# Patient Record
Sex: FEMALE | Race: WHITE | NOT HISPANIC OR LATINO | URBAN - METROPOLITAN AREA
[De-identification: names, ages, dates, MRNs, and addresses within clinical notes are randomized per-mention and may not be internally consistent; named-entity substitution may affect disease eponyms.]

---

## 2017-03-15 NOTE — PATIENT DISCUSSION
UV Protection ATTENDING ADDENDUM: Agree with resident assessment and plan, except: Parents report improved spit ups since thickening feeds. Noted blueness of toes/feet bilaterally today while lying flat and asleep (not associated with feeds). Rash is coming back since yesterday. Parents say that he has been itching at his skin. No bloody stools.    Exam:  VS: afebrile, -150s, RR 40s, no hypoxia, BPs appropriate for age  I/O: 540 ML PO, 2.8 ml/kg/h UOP, no stool  General: well appearing, held by mom, no distress  HEENT: AFOF, normal conjunctiva, moist mucous membranes  Lungs: clear, no difficulty breathing  CV: no murmurs, regular rate and rhythm, no rubs or gallops  Abd: no HSM, soft, not tender, not distended  : normal genitalia  Skin: scattered hives and erythematous blanching macules, few on trunk and arms; none involving palms/soles or  region; no edema, no pallor    Labs/Imaging:  -FOBT negative  -Rhino/Entero+  -CBC: no leukocytosis, no eosinophils  -CMP: mild transaminitis, no acidosis  -CXR: normal    A/P: 4 month old full term male with recent diagnosis of milk protein allergy presents with cyanotic and apnea episodes. Etiology at this time seems most likely related to aspiration of feeds. Since thickening feeds, fewer spit ups and no apneic episodes. Parents do report episode of acrocyanosis this morning though no hypoxic episodes noted. Cardiology has seen and cleared patient (though no echo performed). No concern for underlying neurologic process. Rash does seem consistent with hives and possibly allergic component. Discussed continuing on elecare for now to eliminate possibility of milk protein allergy and continuing to avoid detergents/soaps at home with additives/fragrances. He is hemodynamically stable but requires admission to monitor for continued tolerance of thickened feeds and no further episodes of cyanosis/apnea.    1. Cyanotic/Apneic episodes: Cardiology work up normal. Speech evaluation showing silent penetration with regular formula. ENT exam showing mild post-cricoid edema. By history also with symptoms of reflux. Episodes now seem to be consistent with likely aspiration events and reflux. Overall symptoms improving with change in nipple and thickening of feeds.   -Continue elecare with level 2 nipple and thickened per speech recommendations. Will need to arrange for speech therapy as outpatient.  -Continuous pulse ox to monitor if hypoxia if/when cyanotic episode recurs. Will monitor for further bradycardic episodes as well.  -Can consider starting zantac if needed for continued reflux symptoms. As now improving, will hold off.    2. Milk protein allergy:   -FOr now, will continue on elecare for presumed milk protein allergy. Given his symptoms of spit ups/reflux and hives (intermittent), could be explained by milk protein allergy though FOBT is negative.   -Will follow up with allergy as outpatient.      Anticipated Discharge Date: 10/18 if no furthe rapneic/cyanotic episodes  [ ] Social Work needs:  [ ] Case management needs:  [ ] Other discharge needs:    Family Centered Rounds completed with parents and nursing. Patient seen at 12PM on 10/17/17.  I have read and agree with this Progress Note.  I examined the patient this morning and agree with above resident physical exam, with edits made where appropriate.  I was physically present for the evaluation and management services provided.     [x] Reviewed lab results  [x ] Reviewed Radiology  [ x] Spoke with parents/guardian  [ x] Spoke with consultant    [ x] 35 minutes or more was spent on the total encounter with more than 50% of the visit spent on counseling and / or coordination of care    Yennifer Guerin MD  Pediatric Hospitalist  # [phone]

## 2017-07-14 ENCOUNTER — IMPORTED ENCOUNTER (OUTPATIENT)
Dept: URBAN - METROPOLITAN AREA CLINIC 50 | Facility: CLINIC | Age: 82
End: 2017-07-14

## 2018-03-19 NOTE — PATIENT DISCUSSION
AMD (DRY), OU:  PRESCRIBE AREDS 2 VITAMINS AND UV PROTECTION TO SLOW PROGRESSION. SMOKING CESSATION EMPHASIZED. PRESCRIBE REGULAR AMSLER GRID CHECKS TO SELF MONITOR. PATIENT TO CALL WITH CHANGES FOR RETINA CONSULT. RETURN FOR FOLLOW-UP AS SCHEDULED. SAMPLE OF AREDS GIVEN TODAY.

## 2022-07-20 NOTE — PATIENT DISCUSSION
DRY EYE SYNDROME OU: RX ARTIFICIAL TEARS AS NEEDED TO INCREASE COMFORT OU. IF SYMPTOMS  PERSIST CONSIDER PUNCTAL PLUGS.

## 2022-07-20 NOTE — PATIENT DISCUSSION
AMD (DRY), OU: PRESCRIBE AREDS 2 VITAMINS AND UV PROTECTION TO SLOW PROGRESSION. SMOKING CESSATION EMPHASIZED. PRESCRIBE REGULAR AMSLER GRID CHECKS TO SELF MONITOR. PATIENT TO CALL WITH CHANGES FOR RETINA CONSULT. RETURN FOR FOLLOW-UP AS SCHEDULED.